# Patient Record
(demographics unavailable — no encounter records)

---

## 2025-06-19 NOTE — DISCUSSION/SUMMARY
[FreeTextEntry1] : Barbi is a manasa 32 year-old patient presents in office with no prior history of FFS, seeking evaluation of facial features that exacerbate her gender dysphoria. She began her male-to-female transition in 2023  and has been on hormonal therapy consistently since December 2023   She has been in therapy and was diagnosed with Gender Dysphoria concerned about certain facial features that appear masculine and cause bullying desires facial feminization surgery followed by Transgender team. The following facial features appear masculine and will need to be modified: -forehead, brow, hairline -lipss, upper and lower -chin (but no jawline) -unsure about nose -cheeks -no tracheal bulge   Allergies:  -NKDA   Meds: -Estradiol 60mg IM q5 days -Progesterone PO 200mg QD -Emgality SubQ q 1 month Ubrelvy- prn 100mg for migraines -Botox, every few months, for migraines -[id number] QD -Valtrex 500mg QD     PMHx: -ADHD' Chronic migraines since childhood, without aura, 1-2 episodes per month -denies seizures ADHD   Surgical History  Surgery Type: tONSILLECTOMY   Surgeon: unsure  Year: 2000s No complications, nausea under [facility]     Denies botox to face (for migraines only), filler or silicone injections   SH: Denies smoking marijuana or cigarettes, recreational drug use, or alcohol use Endorses regular exercise  ROS: General health / Constitutional: no fever, no chills, no unusual weight changes, no energy level changes, no night sweats Skin: No color or pigmentation changes, no pruritis, no rashes, no ulcers, Hair: No changes in color, texture, distribution, loss Nails: No color changes, brittleness, infection Head: +migraines (well-controlled), no new jaw pain Eyes: Good visual acuity, no color blindness, no corrective lenses, no photophobia, no diplopia, no blurred vision, no infection, pain, no medications, Ear: no tinnitus, no discharge, no pain, Nose: no epistaxis, no rhinorrhea, no rhinitis, no pain, Mouth & Throat: no gingivitis, no gingival bleeding, no ulcers, no voice changes, no changes in oral mucosa or tongue Neck no stiffness, no pain, no lymphadenitis, no thyroid disorders, Respiratory: no cough, no dyspnea, no wheezing, no chest pain, cyanosis, no pneumonia, no asthma, Cardiovascular: no chest pain, no palpitations, no irregular rhythm, no tachycardia, no bradycardia, no heart failure, no dyspnea on exertion (LIN), no edema Gastrointestinal: no nausea / vomiting, no dysphagia, no reflux / GERD, no abdominal pain, no jaundice Musculoskeletal: no pain in muscles, bones, or joints; no fractures, no dislocations, no muscular weakness, no atrophy Neurological: no paresis, no paralysis, no paresthesia, no seizures, no dizziness, no syncope, no ataxia, no tremor Psychological: no childhood behavioral problems, no irritability, no sleep changes Hematological: no anemia, no bruising, no bleeding tendencies,  PHYSICAL EXAM   General: WDWN, in no distress, A & O x 3 (person, place, time) HEENT Head: AT/NC (atraumatic, normocephalic), including TMJ, sensory and motor;  High forehead, + Prominent brow and lateral orbital rim type III Eyes: EOMI, PERRLA, upper eyelids with hooding, compensation with forehead elevation Ears: exterior, nl hearing, Nose: Overprojected, dorsal hump, some airway flow problems.  Throat & mouth: gd palate elevation, nl tongue mobility, nl tonsil size; + Class III maloclussion, prominent mandibular angle with active masseter,  + boxy wide chin,  +hypoplastic upper and lower lips, + Hypoplastic submalar region/cheeks, +Nasolabial folds Neck: +excess submental fat; no masses, nl pulses, no prominent tracheal bulge ("Farrukh's Apple")   Met with the patient, reviewed all questions and concerns, took photos, and reviewed pre-surgical packet. Needs CT scan (previously completed with [facility] pt will have CD sent over) PCP clearance and letters from providers (letters obtained and scanned into chart during appt)  The patient met with Luis Alberto Ritchie M.D. in conjunction with Allison Villarreal N.P. Patient seen and examined by me, Dr. Luis Alberto Ritchie, personally. Treatment plan reviewed with patient by me. Physician extender was present for assistance only. I attest that the note reflects the visit and our care.   We had meeting with the patient discussing diagnosis and medical management issues and outcomes.   S Procedures: 21139: Frontal sinus anterior wall setback 64504: Orbital reconstruction bilateral 62581: Hairline lowering 26268: Browlift bilateral 53973: Supraorbital bar recontouring bilateral 58194: Tarsorrhaphy bilateral 31279: Mandibular angle contouring 45916-47: Mandibular angle osteotomy 26548: Osseous genioplasty narrowing, shortening 30935: Rhinoplasty open 10556: Submucous resection of septum 81367: Cartilage grafting for nasal reconstruction, use of cadaveric cartilage for  grafts, columellar strut, tip graft 40799 x 2: Upper and lower lip augmentation with temporalis fascia 73902: Fat grafting to malar and nasolabial fold regions bilateral from abdomen first 25 ccs 10051-20: Malar Implant  Discussed orthognathic evaluation/work for Class III maloclussion, pt deferred referral.  Desires facial feminization not jaw surgery for skeletal correction   21139 (Frontal sinus setback): Previous exposure to testosterone has led this patient to have a male appearing forehead with a more bossed shaped; this is opposed to a female appearing forehead that is more flat. A frontal sinus setback procedure will reshape the anterior wall of the frontal sinus by pushing back the bone and change the contour from convex (male-shape) to flat (female-shape). This surgical change will create a more flattened feminine brow appearance.   21256 (bilateral orbital reconstruction): The bone growth that occurred during testosterone exposure in the upper half of each orbital has caused this patient to have male appearing orbital features that contribute to the sense of dysphoria she feels in public. Orbital reconstruction with a reciprocating saw for an L-shaped ostectomy, on both sides will help remove the excessive bony protrusion; this will be followed by bone material grafting and resorbable plate fixation. The bilateral orbital reconstruction will help alleviate these orbital and upper facial male features.  76301 (Browlift): Previous exposure to testosterone has led to the patient having a male M-shaped hairline as opposed to a female upside-down U-shaped hairline. Her receded hairline also creates a high, broad male appearing forehead. Her low set eyebrows on top of her orbital roof rim give her a hightower, male-appearing look. Both of these male traits: a high hairline and low-set brows are causing her intense feelings of dysphoria. She would benefit from bilateral browlift and hairline lowering procedures. Raising her lateral eyebrow with a bilateral browlift will create a more female appearance. She has stressed a strong desire to wear her own natural hair and does not want to always have to wear a wig to cover up her male features.   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 Supraorbital contouring of this lateral orbital region with a pineapple sagar will correct this male feature that is causing this patient dysphoria. These procedures also allows us to do a success browlift procedure since the overhang of bone can inhibit the upper movement of the brow and the removal of this allows for an effective lift.  72111-98 (mandibular angle osteotomy) and 21209 (mandibular angle contouring): This patient has an angular, more boxy jaw which results in male appearing lower face. She also has increased lower facial width related to her mandibular angle lateral projection. Mandibular angle resection and contouring will create a more feminine triangular jaw. By having a mandibular angle reduction through both of these procedures, the lower facial width is narrowed, and this will create a V-shaped, feminine appearance of the jawline when viewed from the front.       21122 (osseous genioplasty): This patient has a wide, large chin that contributes to her feelings of gender dysphoria and being mis-gendered in public. The patient would benefit from an osseous genioplasty that narrows and shortens the chin. The osseous genioplasty will help create a more feminine and more, slender chin.   60679 (Rhinoplasty): This patient's nose has characteristics of a male nose. The male nose is often larger and wider with a more bulbous nasal tip. These male nasal features make her feel masculine which exacerbates her gender dysphoria. A rhinoplasty would be beneficial to feminize her nose by creating a smaller nose and more delicate, softer nasal tip. The lateral dorsal shape will also be feminized by the rhinoplasty. She desires correction of this male feature with a feminization procedure.  Ultimately, the sense of dysphoria is based on the patient and her therapist's guidance, we feel that this procedure will help her and will be successful in feminizing this feature and her face.    20912 + 30520  (Cartilage grafting for nasal reconstruction + SMR: Cartilage grafting is crucial for the performance of the feminizing rhinoplasty. Cartilage grafts including, bilateral  grafts, a columellar strut graft, a dorsal onlay graft, and nasal tip graft are all necessary.   26298 (Fat grafting to malar/temporal facial regions): This patient had prolonged testosterone exposure resulting in male mid-face features with depressed soft tissues in the cheeks and temporal region. More fullness in the cheek and temporal region may be created with fat grafting from the abdomen or hips to the cheek and temporal region creating a more full, feminine appearance. The Haynes fat grafting technique with atraumatic harvest and grafting leads to a 70%+ take of fat grafting to this region and is suggested. This procedure will correct the hypoplastic cheeks and hollowed temporal region.  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 She would benefit from implants lateral to the nose on both sides to establish a vazquez appearance that softens the face so it will look less harsh. Patient requires a revision facial feminization procedure. This particular procedure was never performed as part of her original FFS procedures. After allowing appropriate time for healing, she has realized that she still has a feeling of dysphoria related to this male appearing region. She desires correction of this male feature with a feminization procedure. The goal of this procedure is to complete her facial transition. It is not anticipated that she will need this procedure revised. It is not anticipated that she will need additional FFS procedures. Ultimately, the sense of dysphoria is based on the patient and her therapist's guidance, we feel that this procedure will help her and will be successful in feminizing this feature and her face.      40799 x 2  (Upper and lower lip augmentation): Females are shown to have vazquez lips than males, therefore, an upper lip augmentation will create a more feminine appearance for this patient as she currently has hypoplastic lips. Lip augmentation is also a procedure that not all patients need but we deem that this procedure is necessary for this patient as it well help alleviate her gender dysphoria  Bleeding- It is possible, though unusual, to experience a bleeding episode during or after surgery. Should post-operative bleeding occur, it may require emergency treatment to drain accumulated blood or blood transfusion. Intra-operative blood transfusion may also be required. Do not take any aspirin or anti-inflammatory medications for ten days before or after surgery, as this may increase the risk of bleeding. Non-prescription herbs and dietary supplements can increase the risk of surgical bleeding. Hematoma can occur at any time following injury to the breast. If blood transfusions are necessary to treat blood loss, there is the risk of blood-related infections such as hepatitis and HIV (AIDS). Heparin medications that are used to prevent blood clots in veins can produce bleeding and decreased blood platelets.   Infection- Infection is unusual after surgery. Should an infection occur, additional treatment including antibiotics, hospitalization, or additional surgery may be necessary.   Change Skin Sensation- You may experience a diminished (or loss) of sensitivity of the skin of operative area. Partial or permanent loss of skin sensation can occur after surgery.  Skin Contour Irregularities- Contour and shape irregularities may occur after surgery. Visible and palpable wrinkling may occur. Residual skin irregularities at the ends of the incisions or dog ears are always a possibility when there is excessive redundant skin. This may improve with time, or it can be surgically corrected.   Sutures- Most surgical techniques use deep sutures. You may notice these sutures after your surgery. Sutures may spontaneously poke through the skin, become visible or produce irritation that requires suture removal.   Skin Discoloration / Swelling- Some bruising and swelling normally occurs following surgery. The skin in or near the surgical site can appear either lighter or darker than surrounding skin. Although uncommon, swelling and skin discoloration may persist for long periods of time and, in rare situations, may be permanent.    Skin Sensitivity- Itching, tenderness, or exaggerated responses to hot or cold temperatures may occur after surgery. Usually this resolves during healing, but in rare situations it may be chronic.   Scarring- All surgery leaves scars, some more visible than others. Although good wound healing after a surgical procedure is expected, abnormal scars may occur within the skin and deeper tissues. Scars may be unattractive and of different color than the surrounding skin tone. Scar appearance may also vary within the same scar. Scars may be asymmetrical (appear different on the right and left side of the body). There is the possibility of visible marks in the skin from sutures. In some cases scars may require surgical revision or treatment.   Damage to Deeper Structures- There is the potential for injury to deeper structures including, nerves, blood vessels, muscles during any surgical procedure. The potential for this to occur varies according to the type of procedure being performed. Injury to deeper structures may be temporary or permanent.   Delayed Healing- Wound disruption or delayed wound healing is possible. Some areas of the skin may not heal normally and may take a long time to heal. Areas of skin tissue may die. This may require frequent dressing changes or further surgery to remove the non-healed tissue. Individuals who have decreased blood supply to tissue from past surgery or radiation therapy may be at increased risk for wound healing and poor surgical outcome. Smokers have a greater risk of skin loss and wound healing complications.   Fat Necrosis- Fatty tissue found deep in the skin might die. This may produce areas of firmness within the skin. Additional surgery to remove areas of fat necrosis may be necessary. There is the possibility of contour irregularities in the skin that may result from fat necrosis.   Allergic Reactions- In rare cases, local allergies to tape, suture material and glues, blood products, topical preparations or injected agents have been reported. Serious systemic reactions including shock (anaphylaxis) may occur in response to drugs used during surgery and prescription medicines. Allergic reactions may require additional treatment.     Cardiac and Pulmonary Complications- Pulmonary complications may occur secondarily to both blood clots (pulmonary emboli), fat deposits (fat emboli) or partial collapse of the lungs after general anesthesia. Pulmonary emboli can be life-threatening or fatal in some circumstances. Inactivity and other conditions may increase the incidence of blood clots traveling to the lungs causing a major blood clot that may result in death. It is important to discuss with your physician any past history of swelling in your legs or blood clots that may contribute to this condition. Cardiac complications are a risk with any surgery and anesthesia, even in patients without symptoms. If you experience shortness of breath, chest pain, or unusual heart beats, seek medical attention immediately. Should any of these complications occur, you may require hospitalization and additional treatment.    Surgical Anesthesia- Both local and general anesthesia involve risk. There is the possibility of complications, injury, and even death from all forms of surgical anesthesia or sedation.  Seroma- Infrequently, fluid may accumulate between the skin and the underlying tissues following surgery, trauma or vigorous exercise. Should this problem occur, it may require additional procedures for drainage of fluid.    Shock- In rare circumstances, your surgical procedure can cause severe trauma, particularly when multiple or extensive procedures are performed. Although serious complications are infrequent, infections or excessive fluid loss can lead to severe illness and even death. If surgical shock occurs, hospitalization and additional treatment would be necessary.   Pain- You will experience pain after your surgery. Pain of varying intensity and duration may occur and persist after surgery. Chronic pain may occur very infrequently from nerves becoming trapped in scar tissue or due to tissue stretching.

## 2025-06-19 NOTE — DISCUSSION/SUMMARY
[FreeTextEntry1] : Barbi is a manasa 32 year-old patient presents in office with no prior history of FFS, seeking evaluation of facial features that exacerbate her gender dysphoria. She began her male-to-female transition in 2023  and has been on hormonal therapy consistently since December 2023   She has been in therapy and was diagnosed with Gender Dysphoria concerned about certain facial features that appear masculine and cause bullying desires facial feminization surgery followed by Transgender team. The following facial features appear masculine and will need to be modified: -forehead, brow, hairline -lipss, upper and lower -chin (but no jawline) -unsure about nose -cheeks -no tracheal bulge   Allergies:  -NKDA   Meds: -Estradiol 60mg IM q5 days -Progesterone PO 200mg QD -Emgality SubQ q 1 month Ubrelvy- prn 100mg for migraines -Botox, every few months, for migraines -[id number] QD -Valtrex 500mg QD     PMHx: -ADHD' Chronic migraines since childhood, without aura, 1-2 episodes per month -denies seizures ADHD   Surgical History  Surgery Type: tONSILLECTOMY   Surgeon: unsure  Year: 2000s No complications, nausea under [facility]     Denies botox to face (for migraines only), filler or silicone injections   SH: Denies smoking marijuana or cigarettes, recreational drug use, or alcohol use Endorses regular exercise  ROS: General health / Constitutional: no fever, no chills, no unusual weight changes, no energy level changes, no night sweats Skin: No color or pigmentation changes, no pruritis, no rashes, no ulcers, Hair: No changes in color, texture, distribution, loss Nails: No color changes, brittleness, infection Head: +migraines (well-controlled), no new jaw pain Eyes: Good visual acuity, no color blindness, no corrective lenses, no photophobia, no diplopia, no blurred vision, no infection, pain, no medications, Ear: no tinnitus, no discharge, no pain, Nose: no epistaxis, no rhinorrhea, no rhinitis, no pain, Mouth & Throat: no gingivitis, no gingival bleeding, no ulcers, no voice changes, no changes in oral mucosa or tongue Neck no stiffness, no pain, no lymphadenitis, no thyroid disorders, Respiratory: no cough, no dyspnea, no wheezing, no chest pain, cyanosis, no pneumonia, no asthma, Cardiovascular: no chest pain, no palpitations, no irregular rhythm, no tachycardia, no bradycardia, no heart failure, no dyspnea on exertion (LIN), no edema Gastrointestinal: no nausea / vomiting, no dysphagia, no reflux / GERD, no abdominal pain, no jaundice Musculoskeletal: no pain in muscles, bones, or joints; no fractures, no dislocations, no muscular weakness, no atrophy Neurological: no paresis, no paralysis, no paresthesia, no seizures, no dizziness, no syncope, no ataxia, no tremor Psychological: no childhood behavioral problems, no irritability, no sleep changes Hematological: no anemia, no bruising, no bleeding tendencies,  PHYSICAL EXAM   General: WDWN, in no distress, A & O x 3 (person, place, time) HEENT Head: AT/NC (atraumatic, normocephalic), including TMJ, sensory and motor;  High forehead, + Prominent brow and lateral orbital rim type III Eyes: EOMI, PERRLA, upper eyelids with hooding, compensation with forehead elevation Ears: exterior, nl hearing, Nose: Overprojected, dorsal hump, some airway flow problems.  Throat & mouth: gd palate elevation, nl tongue mobility, nl tonsil size; + Class III maloclussion, prominent mandibular angle with active masseter,  + boxy wide chin,  +hypoplastic upper and lower lips, + Hypoplastic submalar region/cheeks, +Nasolabial folds Neck: +excess submental fat; no masses, nl pulses, no prominent tracheal bulge ("Farrukh's Apple")   Met with the patient, reviewed all questions and concerns, took photos, and reviewed pre-surgical packet. Needs CT scan (previously completed with [facility] pt will have CD sent over) PCP clearance and letters from providers (letters obtained and scanned into chart during appt)  The patient met with Luis Alberto Ritchie M.D. in conjunction with Allison Villarreal N.P. Patient seen and examined by me, Dr. Luis Alberto Ritchie, personally. Treatment plan reviewed with patient by me. Physician extender was present for assistance only. I attest that the note reflects the visit and our care.   We had meeting with the patient discussing diagnosis and medical management issues and outcomes.   S Procedures: 21139: Frontal sinus anterior wall setback 10465: Orbital reconstruction bilateral 66354: Hairline lowering 85043: Browlift bilateral 47055: Supraorbital bar recontouring bilateral 40807: Tarsorrhaphy bilateral 39646: Mandibular angle contouring 26084-12: Mandibular angle osteotomy 80262: Osseous genioplasty narrowing, shortening 72332: Rhinoplasty open 89136: Submucous resection of septum 74800: Cartilage grafting for nasal reconstruction, use of cadaveric cartilage for  grafts, columellar strut, tip graft 40799 x 2: Upper and lower lip augmentation with temporalis fascia 78604: Fat grafting to malar and nasolabial fold regions bilateral from abdomen first 25 ccs 82752-19: Malar Implant  Discussed orthognathic evaluation/work for Class III maloclussion, pt deferred referral.  Desires facial feminization not jaw surgery for skeletal correction   21139 (Frontal sinus setback): Previous exposure to testosterone has led this patient to have a male appearing forehead with a more bossed shaped; this is opposed to a female appearing forehead that is more flat. A frontal sinus setback procedure will reshape the anterior wall of the frontal sinus by pushing back the bone and change the contour from convex (male-shape) to flat (female-shape). This surgical change will create a more flattened feminine brow appearance.   21256 (bilateral orbital reconstruction): The bone growth that occurred during testosterone exposure in the upper half of each orbital has caused this patient to have male appearing orbital features that contribute to the sense of dysphoria she feels in public. Orbital reconstruction with a reciprocating saw for an L-shaped ostectomy, on both sides will help remove the excessive bony protrusion; this will be followed by bone material grafting and resorbable plate fixation. The bilateral orbital reconstruction will help alleviate these orbital and upper facial male features.  56756 (Browlift): Previous exposure to testosterone has led to the patient having a male M-shaped hairline as opposed to a female upside-down U-shaped hairline. Her receded hairline also creates a high, broad male appearing forehead. Her low set eyebrows on top of her orbital roof rim give her a hightower, male-appearing look. Both of these male traits: a high hairline and low-set brows are causing her intense feelings of dysphoria. She would benefit from bilateral browlift and hairline lowering procedures. Raising her lateral eyebrow with a bilateral browlift will create a more female appearance. She has stressed a strong desire to wear her own natural hair and does not want to always have to wear a wig to cover up her male features.   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 Supraorbital contouring of this lateral orbital region with a pineapple sagar will correct this male feature that is causing this patient dysphoria. These procedures also allows us to do a success browlift procedure since the overhang of bone can inhibit the upper movement of the brow and the removal of this allows for an effective lift.  77524-23 (mandibular angle osteotomy) and 21209 (mandibular angle contouring): This patient has an angular, more boxy jaw which results in male appearing lower face. She also has increased lower facial width related to her mandibular angle lateral projection. Mandibular angle resection and contouring will create a more feminine triangular jaw. By having a mandibular angle reduction through both of these procedures, the lower facial width is narrowed, and this will create a V-shaped, feminine appearance of the jawline when viewed from the front.       21122 (osseous genioplasty): This patient has a wide, large chin that contributes to her feelings of gender dysphoria and being mis-gendered in public. The patient would benefit from an osseous genioplasty that narrows and shortens the chin. The osseous genioplasty will help create a more feminine and more, slender chin.   25280 (Rhinoplasty): This patient's nose has characteristics of a male nose. The male nose is often larger and wider with a more bulbous nasal tip. These male nasal features make her feel masculine which exacerbates her gender dysphoria. A rhinoplasty would be beneficial to feminize her nose by creating a smaller nose and more delicate, softer nasal tip. The lateral dorsal shape will also be feminized by the rhinoplasty. She desires correction of this male feature with a feminization procedure.  Ultimately, the sense of dysphoria is based on the patient and her therapist's guidance, we feel that this procedure will help her and will be successful in feminizing this feature and her face.    20912 + 30520  (Cartilage grafting for nasal reconstruction + SMR: Cartilage grafting is crucial for the performance of the feminizing rhinoplasty. Cartilage grafts including, bilateral  grafts, a columellar strut graft, a dorsal onlay graft, and nasal tip graft are all necessary.   39977 (Fat grafting to malar/temporal facial regions): This patient had prolonged testosterone exposure resulting in male mid-face features with depressed soft tissues in the cheeks and temporal region. More fullness in the cheek and temporal region may be created with fat grafting from the abdomen or hips to the cheek and temporal region creating a more full, feminine appearance. The Haynes fat grafting technique with atraumatic harvest and grafting leads to a 70%+ take of fat grafting to this region and is suggested. This procedure will correct the hypoplastic cheeks and hollowed temporal region.  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 She would benefit from implants lateral to the nose on both sides to establish a vazquez appearance that softens the face so it will look less harsh. Patient requires a revision facial feminization procedure. This particular procedure was never performed as part of her original FFS procedures. After allowing appropriate time for healing, she has realized that she still has a feeling of dysphoria related to this male appearing region. She desires correction of this male feature with a feminization procedure. The goal of this procedure is to complete her facial transition. It is not anticipated that she will need this procedure revised. It is not anticipated that she will need additional FFS procedures. Ultimately, the sense of dysphoria is based on the patient and her therapist's guidance, we feel that this procedure will help her and will be successful in feminizing this feature and her face.      40799 x 2  (Upper and lower lip augmentation): Females are shown to have vazquez lips than males, therefore, an upper lip augmentation will create a more feminine appearance for this patient as she currently has hypoplastic lips. Lip augmentation is also a procedure that not all patients need but we deem that this procedure is necessary for this patient as it well help alleviate her gender dysphoria  Bleeding- It is possible, though unusual, to experience a bleeding episode during or after surgery. Should post-operative bleeding occur, it may require emergency treatment to drain accumulated blood or blood transfusion. Intra-operative blood transfusion may also be required. Do not take any aspirin or anti-inflammatory medications for ten days before or after surgery, as this may increase the risk of bleeding. Non-prescription herbs and dietary supplements can increase the risk of surgical bleeding. Hematoma can occur at any time following injury to the breast. If blood transfusions are necessary to treat blood loss, there is the risk of blood-related infections such as hepatitis and HIV (AIDS). Heparin medications that are used to prevent blood clots in veins can produce bleeding and decreased blood platelets.   Infection- Infection is unusual after surgery. Should an infection occur, additional treatment including antibiotics, hospitalization, or additional surgery may be necessary.   Change Skin Sensation- You may experience a diminished (or loss) of sensitivity of the skin of operative area. Partial or permanent loss of skin sensation can occur after surgery.  Skin Contour Irregularities- Contour and shape irregularities may occur after surgery. Visible and palpable wrinkling may occur. Residual skin irregularities at the ends of the incisions or dog ears are always a possibility when there is excessive redundant skin. This may improve with time, or it can be surgically corrected.   Sutures- Most surgical techniques use deep sutures. You may notice these sutures after your surgery. Sutures may spontaneously poke through the skin, become visible or produce irritation that requires suture removal.   Skin Discoloration / Swelling- Some bruising and swelling normally occurs following surgery. The skin in or near the surgical site can appear either lighter or darker than surrounding skin. Although uncommon, swelling and skin discoloration may persist for long periods of time and, in rare situations, may be permanent.    Skin Sensitivity- Itching, tenderness, or exaggerated responses to hot or cold temperatures may occur after surgery. Usually this resolves during healing, but in rare situations it may be chronic.   Scarring- All surgery leaves scars, some more visible than others. Although good wound healing after a surgical procedure is expected, abnormal scars may occur within the skin and deeper tissues. Scars may be unattractive and of different color than the surrounding skin tone. Scar appearance may also vary within the same scar. Scars may be asymmetrical (appear different on the right and left side of the body). There is the possibility of visible marks in the skin from sutures. In some cases scars may require surgical revision or treatment.   Damage to Deeper Structures- There is the potential for injury to deeper structures including, nerves, blood vessels, muscles during any surgical procedure. The potential for this to occur varies according to the type of procedure being performed. Injury to deeper structures may be temporary or permanent.   Delayed Healing- Wound disruption or delayed wound healing is possible. Some areas of the skin may not heal normally and may take a long time to heal. Areas of skin tissue may die. This may require frequent dressing changes or further surgery to remove the non-healed tissue. Individuals who have decreased blood supply to tissue from past surgery or radiation therapy may be at increased risk for wound healing and poor surgical outcome. Smokers have a greater risk of skin loss and wound healing complications.   Fat Necrosis- Fatty tissue found deep in the skin might die. This may produce areas of firmness within the skin. Additional surgery to remove areas of fat necrosis may be necessary. There is the possibility of contour irregularities in the skin that may result from fat necrosis.   Allergic Reactions- In rare cases, local allergies to tape, suture material and glues, blood products, topical preparations or injected agents have been reported. Serious systemic reactions including shock (anaphylaxis) may occur in response to drugs used during surgery and prescription medicines. Allergic reactions may require additional treatment.     Cardiac and Pulmonary Complications- Pulmonary complications may occur secondarily to both blood clots (pulmonary emboli), fat deposits (fat emboli) or partial collapse of the lungs after general anesthesia. Pulmonary emboli can be life-threatening or fatal in some circumstances. Inactivity and other conditions may increase the incidence of blood clots traveling to the lungs causing a major blood clot that may result in death. It is important to discuss with your physician any past history of swelling in your legs or blood clots that may contribute to this condition. Cardiac complications are a risk with any surgery and anesthesia, even in patients without symptoms. If you experience shortness of breath, chest pain, or unusual heart beats, seek medical attention immediately. Should any of these complications occur, you may require hospitalization and additional treatment.    Surgical Anesthesia- Both local and general anesthesia involve risk. There is the possibility of complications, injury, and even death from all forms of surgical anesthesia or sedation.  Seroma- Infrequently, fluid may accumulate between the skin and the underlying tissues following surgery, trauma or vigorous exercise. Should this problem occur, it may require additional procedures for drainage of fluid.    Shock- In rare circumstances, your surgical procedure can cause severe trauma, particularly when multiple or extensive procedures are performed. Although serious complications are infrequent, infections or excessive fluid loss can lead to severe illness and even death. If surgical shock occurs, hospitalization and additional treatment would be necessary.   Pain- You will experience pain after your surgery. Pain of varying intensity and duration may occur and persist after surgery. Chronic pain may occur very infrequently from nerves becoming trapped in scar tissue or due to tissue stretching.